# Patient Record
Sex: FEMALE | Race: ASIAN | NOT HISPANIC OR LATINO | Employment: OTHER | URBAN - METROPOLITAN AREA
[De-identification: names, ages, dates, MRNs, and addresses within clinical notes are randomized per-mention and may not be internally consistent; named-entity substitution may affect disease eponyms.]

---

## 2023-01-04 ENCOUNTER — OFFICE VISIT (OUTPATIENT)
Dept: ORTHOPEDIC SURGERY | Facility: CLINIC | Age: 63
End: 2023-01-04
Payer: MEDICAID

## 2023-01-04 VITALS — WEIGHT: 100 LBS | OXYGEN SATURATION: 96 % | HEIGHT: 55 IN | HEART RATE: 84 BPM | BODY MASS INDEX: 23.14 KG/M2

## 2023-01-04 DIAGNOSIS — M19.049 HAND ARTHRITIS: Primary | ICD-10-CM

## 2023-01-04 PROCEDURE — 99203 OFFICE O/P NEW LOW 30 MIN: CPT | Performed by: STUDENT IN AN ORGANIZED HEALTH CARE EDUCATION/TRAINING PROGRAM

## 2023-01-04 RX ORDER — METHYLPREDNISOLONE 4 MG/1
1 TABLET ORAL DAILY
Qty: 21 TABLET | Refills: 0 | Status: SHIPPED | OUTPATIENT
Start: 2023-01-04 | End: 2023-02-20

## 2023-01-04 NOTE — PROGRESS NOTES
Chief Complaint  Initial Evaluation and Pain of the Left Hand and Initial Evaluation and Pain of the Right Hand    Giuseppe Pate presents to [facility] for   History of Present Illness    The patient presents here today for evaluation of the bilateral hands. The patient reports bilateral hand pain and swelling. She denies injury or trauma. She has had a previous x-ray. She has had previous left forearm surgery after a fracture many years ago.  She denies any trauma to the bilateral hands.  She reports the right is worse than the left.  She is right-hand dominant.  She denies any associated numbness.    No Known Allergies     Social History     Socioeconomic History   • Marital status:    Tobacco Use   • Smoking status: Never   • Smokeless tobacco: Never   Vaping Use   • Vaping Use: Never used   Substance and Sexual Activity   • Alcohol use: Never   • Drug use: Never        I reviewed the patient's chief complaint, history of present illness, review of systems, past medical history, surgical history, family history, social history, medications, and allergy list.     REVIEW OF SYSTEMS    Constitutional: Denies fevers, chills, weight loss  Cardiovascular: Denies chest pain, shortness of breath  Skin: Denies rashes, acute skin changes  Neurologic: Denies headache, loss of consciousness  MSK: bilateral hand pain      Objective   Vital Signs:   Pulse 84   Ht 137.2 cm (54\")   Wt 45.4 kg (100 lb)   SpO2 96%   BMI 24.11 kg/m²     Body mass index is 24.11 kg/m².    Physical Exam    General: Alert. No acute distress.   Right hand- swelling and pain to right wrist. arthritic deformities to the thumb and right wrist. Tender to thumb CMC joints bilaterally. Pain with wrist motion. No signs of infection. Stiffness with finger flexion. Lacks full extension. Tender to DRUJ. Ulnar drift.  Neurovascular intact.    Left hand- swelling and pain to wrist. arthritic  deformities to the thumb and wrist. Tender to thumb CMC joints bilaterally. Pain with wrist motion. No signs of infection. Stiffness with finger flexion. Lack full extension.  Neurovascular intact.    Procedures    Imaging Results (Most Recent)     None                   Assessment and Plan        XR Wrist 3+ View Right    Result Date: 12/19/2022  Narrative: PROCEDURE: XR WRIST 3+ VW RIGHT  COMPARISON: None  INDICATIONS: Pain in the right wrist which started 3 days ago with erythema the posterior aspect of the wrist and proximal posterior hand, mild swelling, tenderness with pal  FINDINGS:   No fractures, dislocations or acute osseous abnormalities are identified.  No erosions are identified.  Soft tissue swelling is present in the wrist.  IMPRESSION: Soft tissue swelling in the right wrist.  No acute osseous abnormalities are identified.   NEAL ANDERSON MD       Electronically Signed and Approved By: NEAL ANDERSON MD on 12/19/2022 at 15:57                Diagnoses and all orders for this visit:    1. Hand arthritis, bilaterally (Primary)        Discussed the treatment plan with the patient.  I reviewed the previous images. Plan for conservative treatment at this time. Prescription for a medrol dose pack given today. Prescription for diclofenac given today to start after finishing medrol dose pack.  Given her bilateral presentation with multiple joints including the wrist, this may represent an inflammatory arthropathy.  We will monitor her response to oral anti-inflammatories and may consider rheumatology referral in the future.    Call or return if worsening symptoms.    Scribed for Dagoberto Del Valle MD by Leticia Mehta  01/04/2023   13:33 EST         Follow Up       2 weeks    Patient was given instructions and counseling regarding her condition or for health maintenance advice. Please see specific information pulled into the AVS if appropriate.       I have personally performed the services described  in this document as scribed by the above individual and it is both accurate and complete.     Dagoberto Del Valle MD  01/04/23  13:43 EST

## 2023-01-30 ENCOUNTER — OFFICE VISIT (OUTPATIENT)
Dept: ORTHOPEDIC SURGERY | Facility: CLINIC | Age: 63
End: 2023-01-30
Payer: MEDICAID

## 2023-01-30 VITALS — HEIGHT: 55 IN | HEART RATE: 90 BPM | OXYGEN SATURATION: 98 % | WEIGHT: 96 LBS | BODY MASS INDEX: 22.21 KG/M2

## 2023-01-30 DIAGNOSIS — M19.049 HAND ARTHRITIS: ICD-10-CM

## 2023-01-30 DIAGNOSIS — M19.049 HAND ARTHRITIS: Primary | ICD-10-CM

## 2023-01-30 PROCEDURE — 99213 OFFICE O/P EST LOW 20 MIN: CPT | Performed by: STUDENT IN AN ORGANIZED HEALTH CARE EDUCATION/TRAINING PROGRAM

## 2023-01-30 NOTE — PROGRESS NOTES
"Chief Complaint  Follow-up and Pain of the Left Hand and Follow-up and Pain of the Right Hand    Subjective          Leeanna Pate presents to [facility] for   History of Present Illness    The patient presents here today for follow up evaluation of the bilateral hands. The patient has been treating her bilateral hand osteoarthritis conservatively. She was given a medrol dose pack and diclofenac at her previous visit.     No Known Allergies     Social History     Socioeconomic History   • Marital status:    Tobacco Use   • Smoking status: Never   • Smokeless tobacco: Never   Vaping Use   • Vaping Use: Never used   Substance and Sexual Activity   • Alcohol use: Never   • Drug use: Never        I reviewed the patient's chief complaint, history of present illness, review of systems, past medical history, surgical history, family history, social history, medications, and allergy list.     REVIEW OF SYSTEMS    Constitutional: Denies fevers, chills, weight loss  Cardiovascular: Denies chest pain, shortness of breath  Skin: Denies rashes, acute skin changes  Neurologic: Denies headache, loss of consciousness  MSK: bilateral hand pain      Objective   Vital Signs:   Pulse 90   Ht 137.2 cm (54\")   Wt 43.5 kg (96 lb)   SpO2 98%   BMI 23.15 kg/m²     Body mass index is 23.15 kg/m².    Physical Exam    General: Alert. No acute distress.   Left hand- Arthritic deformity and swelling at left 1st and second MCP joint. Sensation to light touch median, radial, ulnar nerve. Positive AIN, PIN, ulnar nerve motor function. Positive pulses. Stiffness with finger flexion       Right hand-Tender to the Right distal ulna and tfcc. Tender to the radial carpal joint. Tender to thumb CMC joints bilaterally. Pain with wrist motion. No signs of infection. Stiffness with finger flexion. Lack full extension.  Neurovascular intact.    Procedures    Imaging Results (Most Recent)     Procedure Component " Value Units Date/Time    XR Hand 3+ View Left [[id number]] Resulted: 01/30/23 1556     Updated: 01/30/23 1557    Narrative:      Indications: Left hand pain    Views: AP, lateral left hand    Findings: Arthritic changes are seen at the first and second MCP joints.    Joints are reduced.  Deformity to the ulnar styloid and partially   visualized plate and screw construct on the radial shaft.    Comparative Data: No comparative data available                     Assessment and Plan        XR Hand 3+ View Left    Result Date: 1/30/2023  Narrative: Indications: Left hand pain Views: AP, lateral left hand Findings: Arthritic changes are seen at the first and second MCP joints.  Joints are reduced.  Deformity to the ulnar styloid and partially visualized plate and screw construct on the radial shaft. Comparative Data: No comparative data available        Diagnoses and all orders for this visit:    1. Hand arthritis, bilaterally.  (Primary)  -     XR Hand 3+ View Left  -     Ambulatory Referral to Orthopedic Surgery    2. Hand arthritis  -     XR Hand 3+ View Left  -     Ambulatory Referral to Orthopedic Surgery        Discussed the treatment plan with the patient.  I reviewed the x-rays that were obtained today with the patient. Plan for a referral to a hand specialist in [place]. The patient expressed understanding and wished to proceed.     Call or return if worsening symptoms.    Scribed for Dagoberto Del Valle MD by Leticia Mehta  01/30/2023   14:47 EST         Follow Up       PRN    Patient was given instructions and counseling regarding her condition or for health maintenance advice. Please see specific information pulled into the AVS if appropriate.       I have personally performed the services described in this document as scribed by the above individual and it is both accurate and complete.     Dagoberto Del Valle MD  01/31/23  13:41 EST

## 2023-02-08 ENCOUNTER — TELEPHONE (OUTPATIENT)
Dept: FAMILY MEDICINE CLINIC | Facility: CLINIC | Age: 63
End: 2023-02-08
Payer: MEDICAID

## 2023-02-08 NOTE — TELEPHONE ENCOUNTER
ATTEMPTED TO CALL PATIENT ABOUT MISSED APPOINTMENT ON 2/6/23 AT 1:00. LEFT VOICE MAIL ABOUT THAT AND OFFICE ATTENDANCE POLICY.

## 2023-06-01 ENCOUNTER — TELEPHONE (OUTPATIENT)
Dept: PODIATRY | Facility: CLINIC | Age: 63
End: 2023-06-01

## 2024-02-27 ENCOUNTER — OFFICE VISIT (OUTPATIENT)
Dept: FAMILY MEDICINE CLINIC | Facility: CLINIC | Age: 64
End: 2024-02-27
Payer: MEDICAID

## 2024-02-27 VITALS
BODY MASS INDEX: 22.91 KG/M2 | HEIGHT: 55 IN | OXYGEN SATURATION: 98 % | SYSTOLIC BLOOD PRESSURE: 100 MMHG | HEART RATE: 78 BPM | TEMPERATURE: 97.3 F | DIASTOLIC BLOOD PRESSURE: 71 MMHG | WEIGHT: 99 LBS

## 2024-02-27 DIAGNOSIS — M25.531 PAIN IN BOTH WRISTS: Primary | ICD-10-CM

## 2024-02-27 DIAGNOSIS — M25.532 PAIN IN BOTH WRISTS: Primary | ICD-10-CM

## 2024-02-27 DIAGNOSIS — I10 HYPERTENSION, UNSPECIFIED TYPE: ICD-10-CM

## 2024-02-27 DIAGNOSIS — M19.049 HAND ARTHRITIS: ICD-10-CM

## 2024-02-27 LAB
ALBUMIN SERPL-MCNC: 3.9 G/DL (ref 3.5–5.2)
ALBUMIN/GLOB SERPL: 1.2 G/DL
ALP SERPL-CCNC: 65 U/L (ref 39–117)
ALT SERPL W P-5'-P-CCNC: 12 U/L (ref 1–33)
ANION GAP SERPL CALCULATED.3IONS-SCNC: 9 MMOL/L (ref 5–15)
AST SERPL-CCNC: 14 U/L (ref 1–32)
BASOPHILS # BLD AUTO: 0.03 10*3/MM3 (ref 0–0.2)
BASOPHILS NFR BLD AUTO: 0.4 % (ref 0–1.5)
BILIRUB SERPL-MCNC: 0.2 MG/DL (ref 0–1.2)
BUN SERPL-MCNC: 20 MG/DL (ref 8–23)
BUN/CREAT SERPL: 24.4 (ref 7–25)
CALCIUM SPEC-SCNC: 9.4 MG/DL (ref 8.6–10.5)
CHLORIDE SERPL-SCNC: 107 MMOL/L (ref 98–107)
CHROMATIN AB SERPL-ACNC: 76 IU/ML (ref 0–14)
CO2 SERPL-SCNC: 24 MMOL/L (ref 22–29)
CREAT SERPL-MCNC: 0.82 MG/DL (ref 0.57–1)
CRP SERPL-MCNC: 2 MG/DL (ref 0–0.5)
DEPRECATED RDW RBC AUTO: 39.8 FL (ref 37–54)
EGFRCR SERPLBLD CKD-EPI 2021: 80 ML/MIN/1.73
EOSINOPHIL # BLD AUTO: 0.16 10*3/MM3 (ref 0–0.4)
EOSINOPHIL NFR BLD AUTO: 2.2 % (ref 0.3–6.2)
ERYTHROCYTE [DISTWIDTH] IN BLOOD BY AUTOMATED COUNT: 13.2 % (ref 12.3–15.4)
GLOBULIN UR ELPH-MCNC: 3.3 GM/DL
GLUCOSE SERPL-MCNC: 98 MG/DL (ref 65–99)
HCT VFR BLD AUTO: 36.6 % (ref 34–46.6)
HGB BLD-MCNC: 12 G/DL (ref 12–15.9)
IMM GRANULOCYTES # BLD AUTO: 0.04 10*3/MM3 (ref 0–0.05)
IMM GRANULOCYTES NFR BLD AUTO: 0.6 % (ref 0–0.5)
LYMPHOCYTES # BLD AUTO: 1.23 10*3/MM3 (ref 0.7–3.1)
LYMPHOCYTES NFR BLD AUTO: 17 % (ref 19.6–45.3)
MCH RBC QN AUTO: 27.2 PG (ref 26.6–33)
MCHC RBC AUTO-ENTMCNC: 32.8 G/DL (ref 31.5–35.7)
MCV RBC AUTO: 83 FL (ref 79–97)
MONOCYTES # BLD AUTO: 0.61 10*3/MM3 (ref 0.1–0.9)
MONOCYTES NFR BLD AUTO: 8.4 % (ref 5–12)
NEUTROPHILS NFR BLD AUTO: 5.15 10*3/MM3 (ref 1.7–7)
NEUTROPHILS NFR BLD AUTO: 71.4 % (ref 42.7–76)
NRBC BLD AUTO-RTO: 0 /100 WBC (ref 0–0.2)
PLATELET # BLD AUTO: 197 10*3/MM3 (ref 140–450)
PMV BLD AUTO: 12 FL (ref 6–12)
POTASSIUM SERPL-SCNC: 4.2 MMOL/L (ref 3.5–5.2)
PROT SERPL-MCNC: 7.2 G/DL (ref 6–8.5)
RBC # BLD AUTO: 4.41 10*6/MM3 (ref 3.77–5.28)
SODIUM SERPL-SCNC: 140 MMOL/L (ref 136–145)
WBC NRBC COR # BLD AUTO: 7.22 10*3/MM3 (ref 3.4–10.8)

## 2024-02-27 PROCEDURE — 80053 COMPREHEN METABOLIC PANEL: CPT | Performed by: STUDENT IN AN ORGANIZED HEALTH CARE EDUCATION/TRAINING PROGRAM

## 2024-02-27 PROCEDURE — 86140 C-REACTIVE PROTEIN: CPT | Performed by: STUDENT IN AN ORGANIZED HEALTH CARE EDUCATION/TRAINING PROGRAM

## 2024-02-27 PROCEDURE — 85025 COMPLETE CBC W/AUTO DIFF WBC: CPT | Performed by: STUDENT IN AN ORGANIZED HEALTH CARE EDUCATION/TRAINING PROGRAM

## 2024-02-27 PROCEDURE — 86200 CCP ANTIBODY: CPT | Performed by: STUDENT IN AN ORGANIZED HEALTH CARE EDUCATION/TRAINING PROGRAM

## 2024-02-27 PROCEDURE — 86038 ANTINUCLEAR ANTIBODIES: CPT | Performed by: STUDENT IN AN ORGANIZED HEALTH CARE EDUCATION/TRAINING PROGRAM

## 2024-02-27 PROCEDURE — 86431 RHEUMATOID FACTOR QUANT: CPT | Performed by: STUDENT IN AN ORGANIZED HEALTH CARE EDUCATION/TRAINING PROGRAM

## 2024-02-27 RX ORDER — BIOTIN 10 MG
TABLET ORAL
COMMUNITY

## 2024-02-27 NOTE — PROGRESS NOTES
"Chief Complaint  Arthritis and Establish Care    Subjective      History of Present Illness    Leeanna Pate is a 63 y.o. female who presents to [facility]  HTN, B/L hand arthritis presents to establish care today. Pt states she was previously on amlodipine but stopped it due to running out.   She wants her lab work checked today she is  and speak [employer] her daughter-in-law is here translating for her today.    Objective   Vital Signs:   Vitals:    02/27/24 1455   BP: 100/71   Pulse: 78   Temp: 97.3 °F (36.3 °C)   SpO2: 98%   Weight: 44.9 kg (99 lb)   Height: 137.2 cm (54\")     Body mass index is 23.87 kg/m².    Wt Readings from Last 3 Encounters:   02/27/24 44.9 kg (99 lb)   02/08/24 43.8 kg (96 lb 9.6 oz)   05/31/23 45.4 kg (100 lb)     BP Readings from Last 3 Encounters:   02/27/24 100/71   02/08/24 131/80   05/31/23 101/53       Health Maintenance   Topic Date Due    MAMMOGRAM  Never done    COLORECTAL CANCER SCREENING  Never done    HEPATITIS C SCREENING  Never done    ANNUAL PHYSICAL  Never done    PAP SMEAR  Never done    COVID-19 Vaccine (2 - 2023-24 season) 09/01/2023    ZOSTER VACCINE (1 of 2) 02/27/2024 (Originally 6/1/2009)    INFLUENZA VACCINE  03/31/2024 (Originally 8/1/2023)    RSV Vaccine - Adults (1 - 1-dose 60+ series) 02/27/2025 (Originally 6/1/2019)    TDAP/TD VACCINES (1 - Tdap) 02/27/2025 (Originally 6/1/1978)    Pneumococcal Vaccine 0-64  Aged Out       Physical Exam   General: Alert. No acute distress.   Right hand- swelling and pain to right wrist. arthritic deformities to the thumb and right wrist. Tender to thumb CMC joints bilaterally. Pain with wrist motion. No signs of infection. Stiffness with finger flexion. Lacks full extension. Neurovascular intact.     Left hand- swelling and pain to wrist. arthritic deformities to the thumb and wrist. Tender to thumb CMC joints bilaterally. Pain with wrist motion. No signs of infection. Stiffness with " finger flexion. Lack full extension.  Neurovascular intact.     Result Review :     The following data was reviewed by: Cristhian Matos MD on 02/27/2024:      Procedures          Diagnoses and all orders for this visit:    1. Pain in both wrists (Primary)  -     Ambulatory Referral to Rheumatology  -     RADHA Direct Reflex to [facility] [employer]; Future  -     Cyclic citrul peptide antibody, IgG/IgA; Future  -     C-reactive protein; Future  -     Rheumatoid Factor; Future  -     RADHA Direct Reflex to [employer]  -     Cyclic citrul peptide antibody, IgG/IgA  -     C-reactive protein  -     Rheumatoid Factor    2. Hand arthritis  -     Ambulatory Referral to Rheumatology  -     RADHA Direct Reflex to [employer]; Future  -     Cyclic citrul peptide antibody, IgG/IgA; Future  -     C-reactive protein; Future  -     Rheumatoid Factor; Future  -     RADHA Direct Reflex to [employer]  -     Cyclic citrul peptide antibody, IgG/IgA  -     C-reactive protein  -     Rheumatoid Factor    3. Hypertension, unspecified type  -     CBC w AUTO Differential; Future  -     Comprehensive metabolic panel; Future  -     CBC w AUTO Differential  -     Comprehensive metabolic panel    Labs ordered, plan on discontinuing amlodipine due to blood pressure being normal today and not being on medication.      BMI is within normal parameters. No other follow-up for BMI required.         FOLLOW UP  No follow-ups on file.  Patient was given instructions and counseling regarding her condition or for health maintenance advice. Please see specific information pulled into the AVS if appropriate.       Cristhian Matos MD  02/27/24  16:06 EST    CURRENT & DISCONTINUED MEDICATIONS  Current Outpatient Medications   Medication Instructions    amLODIPine (NORVASC) 5 mg, Oral, Daily    Biotin 10 MG tablet Oral    cholecalciferol (VITAMIN D3) 2,000 Units, Oral, Daily    naproxen (NAPROSYN) 500 mg, Oral, 2 Times Daily PRN       There are no discontinued medications.

## 2024-02-29 LAB
ANA SER QL: NEGATIVE
CCP IGA+IGG SERPL IA-ACNC: >250 UNITS (ref 0–19)

## 2024-03-05 DIAGNOSIS — M05.79 RHEUMATOID ARTHRITIS INVOLVING MULTIPLE SITES WITH POSITIVE RHEUMATOID FACTOR: Primary | ICD-10-CM

## 2024-03-25 ENCOUNTER — TELEPHONE (OUTPATIENT)
Dept: FAMILY MEDICINE CLINIC | Facility: CLINIC | Age: 64
End: 2024-03-25
Payer: MEDICAID

## 2024-03-25 NOTE — TELEPHONE ENCOUNTER
Caller: GLO WALKER    Relationship: Emergency Contact    Best call back number: [phone]    Caller requesting test results: DAUGHTER IN LAW     What test was performed: LABS     Where was the test performed: IN OFFICE     Additional notes: PATIENT'S DAUGHTER IN LAW HAS ADDITIONAL QUESTIONS REGARDING THE LEVELS OF LABS.   SHE IS REQUESTING COPY OF LAB RESULTS.

## 2024-04-15 ENCOUNTER — OFFICE VISIT (OUTPATIENT)
Dept: FAMILY MEDICINE CLINIC | Facility: CLINIC | Age: 64
End: 2024-04-15
Payer: MEDICAID

## 2024-04-15 VITALS
BODY MASS INDEX: 22.45 KG/M2 | HEIGHT: 55 IN | DIASTOLIC BLOOD PRESSURE: 80 MMHG | TEMPERATURE: 97 F | SYSTOLIC BLOOD PRESSURE: 112 MMHG | OXYGEN SATURATION: 98 % | WEIGHT: 97 LBS | HEART RATE: 84 BPM

## 2024-04-15 DIAGNOSIS — M05.7A RHEUMATOID ARTHRITIS OF OTHER SITE WITH POSITIVE RHEUMATOID FACTOR: Primary | ICD-10-CM

## 2024-04-15 PROCEDURE — 99213 OFFICE O/P EST LOW 20 MIN: CPT | Performed by: STUDENT IN AN ORGANIZED HEALTH CARE EDUCATION/TRAINING PROGRAM

## 2024-04-15 NOTE — PROGRESS NOTES
"Chief Complaint  Follow-up (RA)    Subjective      History of Present Illness    Leeanna Pate is a 63 y.o. female who presents to [facility] presents for follow-up on rheumatoid arthritis labs.  Her rheumatoid factor was noted to be 76, CRP was 2, anti-CCP greater than 250 RADHA was negative.  Presents today for follow-up we will get her referred to Dr. Solares here last month for rheumatology have already spoken to his office and they agreed to accept the patient for treatment.        Objective   Vital Signs:   Vitals:    04/15/24 0839   BP: 112/80   Pulse: 84   Temp: 97 °F (36.1 °C)   SpO2: 98%   Weight: 44 kg (97 lb)   Height: 137.2 cm (54\")     Body mass index is 23.39 kg/m².    Wt Readings from Last 3 Encounters:   04/15/24 44 kg (97 lb)   04/11/24 43.5 kg (96 lb)   02/27/24 44.9 kg (99 lb)     BP Readings from Last 3 Encounters:   04/15/24 112/80   04/11/24 174/94   02/27/24 100/71       Health Maintenance   Topic Date Due    MAMMOGRAM  Never done    COLORECTAL CANCER SCREENING  Never done    ZOSTER VACCINE (1 of 2) Never done    HEPATITIS C SCREENING  Never done    ANNUAL PHYSICAL  Never done    PAP SMEAR  Never done    COVID-19 Vaccine (2 - 2023-24 season) 09/01/2023    RSV Vaccine - Adults (1 - 1-dose 60+ series) 02/27/2025 (Originally 6/1/2019)    TDAP/TD VACCINES (1 - Tdap) 02/27/2025 (Originally 6/1/1978)    INFLUENZA VACCINE  08/01/2024    Pneumococcal Vaccine 0-64  Aged Out       Physical Exam   General:  AAO x3, no acute distress.  Eyes:  EOMI, PERRLA  Ears/Nose/Mouth/Throat:  Moist mucous membranes  Respiratory:  CTA bilaterally, no wheezes rales or rhonchi  Cardiovascular:  RRR no murmur rubs or gallops  Gastrointestinal:  Abdomen soft nondistended nontender bowel sounds present x4 quadrants  Musculoskeletal:  Moves all 4 extremities spontaneously, no apparent weakness  Skin:  Warm, dry, no skin rashes or lesions  Neurologic:  AAO x3, cranial nerves 2-12 grossly " intact, no focal neuro deficits  Psychiatric:  Normal mood and affect    Result Review :     The following data was reviewed by: Cristhian Matos MD on 04/15/2024:      Procedures          Diagnoses and all orders for this visit:    1. Rheumatoid arthritis of other site with positive rheumatoid factor (Primary)  -     Ambulatory Referral to Rheumatology         BMI is within normal parameters. No other follow-up for BMI required.         FOLLOW UP  No follow-ups on file.  Patient was given instructions and counseling regarding her condition or for health maintenance advice. Please see specific information pulled into the AVS if appropriate.       Cristhian Matos MD  04/15/24  09:12 EDT    CURRENT & DISCONTINUED MEDICATIONS  Current Outpatient Medications   Medication Instructions    celecoxib (CELEBREX) 50 mg, Oral, 2 Times Daily    methylPREDNISolone (MEDROL) 4 MG dose pack Take as directed on package instructions.       There are no discontinued medications.

## 2024-04-16 ENCOUNTER — TELEPHONE (OUTPATIENT)
Dept: FAMILY MEDICINE CLINIC | Facility: CLINIC | Age: 64
End: 2024-04-16
Payer: MEDICAID

## 2024-04-16 NOTE — TELEPHONE ENCOUNTER
Caller: GLO WALKER    Relationship: Emergency Contact    Best call back number:     [phone]       Who are you requesting to speak with (clinical staff, provider,  specific staff member): DR MONTIEL      What was the call regarding: PATIENT'S DAUGHTER IN LAW STATES THAT DR MONTIEL HAD TOLD THEM THAT RHEUMATOLOGY WOULD BE CALLING HER YESTERDAY AFTERNOON BUT THAT SHE NEVER GOT A CALL.     SHE STATES THAT SHE WOULD STILL LIKE FOR THE RHEUMATOLOGIST TO CALL.

## 2024-04-16 NOTE — TELEPHONE ENCOUNTER
DR LOPEZ'S OFFICE WILL NOT TAKE PATIENTS INSURANCE, PATIENT HAS A APPOINTMENT WITH DR RENEA WILLIS 9-10-14 AT 9:40 AND THAT OFFICE HAS HER ON A CANCELATION LIST

## 2024-05-08 ENCOUNTER — OFFICE VISIT (OUTPATIENT)
Dept: FAMILY MEDICINE CLINIC | Facility: CLINIC | Age: 64
End: 2024-05-08
Payer: MEDICAID

## 2024-05-08 VITALS
WEIGHT: 95 LBS | OXYGEN SATURATION: 99 % | BODY MASS INDEX: 21.98 KG/M2 | HEIGHT: 55 IN | HEART RATE: 97 BPM | TEMPERATURE: 97.1 F

## 2024-05-08 DIAGNOSIS — M25.532 PAIN IN BOTH WRISTS: ICD-10-CM

## 2024-05-08 DIAGNOSIS — M25.531 PAIN IN BOTH WRISTS: ICD-10-CM

## 2024-05-08 DIAGNOSIS — R79.82 ELEVATED C-REACTIVE PROTEIN (CRP): ICD-10-CM

## 2024-05-08 DIAGNOSIS — M05.79 RHEUMATOID ARTHRITIS INVOLVING MULTIPLE SITES WITH POSITIVE RHEUMATOID FACTOR: Primary | ICD-10-CM

## 2024-05-08 PROCEDURE — 99214 OFFICE O/P EST MOD 30 MIN: CPT | Performed by: STUDENT IN AN ORGANIZED HEALTH CARE EDUCATION/TRAINING PROGRAM

## 2024-05-08 RX ORDER — METHOTREXATE 2.5 MG/1
7.5 TABLET ORAL WEEKLY
Qty: 12 TABLET | Refills: 2 | Status: SHIPPED | OUTPATIENT
Start: 2024-05-08 | End: 2024-08-06

## 2024-05-08 RX ORDER — FOLIC ACID 1 MG/1
1 TABLET ORAL DAILY
Qty: 90 TABLET | Refills: 3 | Status: SHIPPED | OUTPATIENT
Start: 2024-05-08 | End: 2024-08-06

## 2024-07-02 ENCOUNTER — OFFICE VISIT (OUTPATIENT)
Dept: FAMILY MEDICINE CLINIC | Facility: CLINIC | Age: 64
End: 2024-07-02
Payer: MEDICARE

## 2024-07-02 VITALS
SYSTOLIC BLOOD PRESSURE: 138 MMHG | HEART RATE: 89 BPM | DIASTOLIC BLOOD PRESSURE: 78 MMHG | HEIGHT: 55 IN | BODY MASS INDEX: 21.59 KG/M2 | OXYGEN SATURATION: 98 % | WEIGHT: 93.3 LBS | TEMPERATURE: 98.1 F

## 2024-07-02 DIAGNOSIS — M25.562 CHRONIC PAIN OF BOTH KNEES: ICD-10-CM

## 2024-07-02 DIAGNOSIS — Z12.31 ENCOUNTER FOR SCREENING MAMMOGRAM FOR MALIGNANT NEOPLASM OF BREAST: Primary | ICD-10-CM

## 2024-07-02 DIAGNOSIS — G89.29 CHRONIC PAIN OF BOTH KNEES: ICD-10-CM

## 2024-07-02 DIAGNOSIS — M05.7A RHEUMATOID ARTHRITIS OF OTHER SITE WITH POSITIVE RHEUMATOID FACTOR: ICD-10-CM

## 2024-07-02 DIAGNOSIS — M25.561 CHRONIC PAIN OF BOTH KNEES: ICD-10-CM

## 2024-07-02 DIAGNOSIS — Z78.0 POST-MENOPAUSAL: ICD-10-CM

## 2024-07-02 DIAGNOSIS — Z12.11 SCREEN FOR COLON CANCER: ICD-10-CM

## 2024-07-02 PROCEDURE — 1159F MED LIST DOCD IN RCRD: CPT | Performed by: STUDENT IN AN ORGANIZED HEALTH CARE EDUCATION/TRAINING PROGRAM

## 2024-07-02 PROCEDURE — 1125F AMNT PAIN NOTED PAIN PRSNT: CPT | Performed by: STUDENT IN AN ORGANIZED HEALTH CARE EDUCATION/TRAINING PROGRAM

## 2024-07-02 PROCEDURE — 99214 OFFICE O/P EST MOD 30 MIN: CPT | Performed by: STUDENT IN AN ORGANIZED HEALTH CARE EDUCATION/TRAINING PROGRAM

## 2024-07-02 PROCEDURE — 1160F RVW MEDS BY RX/DR IN RCRD: CPT | Performed by: STUDENT IN AN ORGANIZED HEALTH CARE EDUCATION/TRAINING PROGRAM

## 2024-07-02 NOTE — PROGRESS NOTES
"Chief Complaint  Arthritis (3 month f/u )    Subjective      History of Present Illness    Leeanna Pate is a 64 y.o. female who presents to [facility]   With a past medical history of arthritis, presents for follow-up today we started her on methotrexate her joints are beginning to feel little bit better she has an appointment with rheumatology in 2 weeks.  Has no acute complaints today she does complain of bilateral knee pain and would like to start physical therapy.      Objective   Vital Signs:   Vitals:    07/02/24 1409   BP: 138/78   Pulse: 89   Temp: 98.1 °F (36.7 °C)   SpO2: 98%   Weight: 42.3 kg (93 lb 4.8 oz)   Height: 137.2 cm (54\")   PainSc:   5   PainLoc: Leg     Body mass index is 22.5 kg/m².    Wt Readings from Last 3 Encounters:   07/02/24 42.3 kg (93 lb 4.8 oz)   05/08/24 43.1 kg (95 lb)   04/15/24 44 kg (97 lb)     BP Readings from Last 3 Encounters:   07/02/24 138/78   04/15/24 112/80   04/11/24 174/94       Health Maintenance   Topic Date Due    MAMMOGRAM  Never done    DXA SCAN  Never done    COLORECTAL CANCER SCREENING  Never done    ZOSTER VACCINE (1 of 2) Never done    HEPATITIS C SCREENING  Never done    ANNUAL WELLNESS VISIT  Never done    Pneumococcal Vaccine 65+ (1 of 1 - PCV) Never done    COVID-19 Vaccine (2 - 2023-24 season) 07/28/2024 (Originally 9/1/2023)    RSV Vaccine - Adults (1 - 1-dose 60+ series) 02/27/2025 (Originally 6/1/2019)    TDAP/TD VACCINES (1 - Tdap) 02/27/2025 (Originally 6/1/1978)    INFLUENZA VACCINE  08/01/2024       Physical Exam   General:  AAO x3, no acute distress.  Eyes:  EOMI, PERRLA  Ears/Nose/Mouth/Throat:  Moist mucous membranes  Respiratory:  CTA bilaterally, no wheezes rales or rhonchi  Cardiovascular:  RRR no murmur rubs or gallops  Gastrointestinal:  Abdomen soft nondistended nontender bowel sounds present x4 quadrants  Musculoskeletal: Right hand with significant rheumatoid nodules left hand wrist tenderness moves " all 4 extremities spontaneously, no apparent weakness  Skin:  Warm, dry, no skin rashes or lesions  Neurologic:  AAO x3, cranial nerves 2-12 grossly intact, no focal neuro deficits  Psychiatric:  Normal mood and affect    Result Review :     The following data was reviewed by: Cristhian Matos MD on 07/02/2024:      Procedures          Diagnoses and all orders for this visit:    1. Encounter for screening mammogram for malignant neoplasm of breast (Primary)  -     Mammo Screening Digital Tomosynthesis Bilateral With CAD; Future    2. Screen for colon cancer    3. Chronic pain of both knees  -     Ambulatory Referral to Physical Therapy    4. Rheumatoid arthritis of other site with positive rheumatoid factor    5. Post-menopausal  -     DEXA Bone Density Axial    Continue taking methotrexate 7.5 mg weekly and folic acid daily.  Her mammogram physical therapy and DEXA scan today.  Patient will return in 3 months for annual physical.  BMI is within normal parameters. No other follow-up for BMI required.         FOLLOW UP  Return in about 2 months (around 9/2/2024).  Patient was given instructions and counseling regarding her condition or for health maintenance advice. Please see specific information pulled into the AVS if appropriate.       Cristhian Matos MD  07/03/24  08:25 EDT    CURRENT & DISCONTINUED MEDICATIONS  Current Outpatient Medications   Medication Instructions    diclofenac (VOLTAREN) 50 mg, Oral, 2 Times Daily PRN    folic acid (FOLVITE) 1 mg, Oral, Daily    methotrexate 7.5 mg, Oral, Weekly       There are no discontinued medications.

## 2024-11-14 ENCOUNTER — OFFICE VISIT (OUTPATIENT)
Dept: FAMILY MEDICINE CLINIC | Facility: CLINIC | Age: 64
End: 2024-11-14
Payer: MEDICARE

## 2024-11-14 VITALS
OXYGEN SATURATION: 98 % | HEIGHT: 55 IN | TEMPERATURE: 97.3 F | WEIGHT: 95 LBS | DIASTOLIC BLOOD PRESSURE: 84 MMHG | HEART RATE: 94 BPM | BODY MASS INDEX: 21.98 KG/M2 | SYSTOLIC BLOOD PRESSURE: 130 MMHG

## 2024-11-14 DIAGNOSIS — I10 HYPERTENSION, UNSPECIFIED TYPE: ICD-10-CM

## 2024-11-14 DIAGNOSIS — M25.532 PAIN IN BOTH WRISTS: ICD-10-CM

## 2024-11-14 DIAGNOSIS — Z12.31 SCREENING MAMMOGRAM FOR BREAST CANCER: ICD-10-CM

## 2024-11-14 DIAGNOSIS — Z23 NEED FOR INFLUENZA VACCINATION: Primary | ICD-10-CM

## 2024-11-14 DIAGNOSIS — Z12.11 COLON CANCER SCREENING: ICD-10-CM

## 2024-11-14 DIAGNOSIS — M05.7A RHEUMATOID ARTHRITIS OF OTHER SITE WITH POSITIVE RHEUMATOID FACTOR: ICD-10-CM

## 2024-11-14 DIAGNOSIS — M25.531 PAIN IN BOTH WRISTS: ICD-10-CM

## 2024-11-14 DIAGNOSIS — Z78.0 POST-MENOPAUSAL: ICD-10-CM

## 2024-11-14 PROCEDURE — G0008 ADMIN INFLUENZA VIRUS VAC: HCPCS | Performed by: STUDENT IN AN ORGANIZED HEALTH CARE EDUCATION/TRAINING PROGRAM

## 2024-11-14 PROCEDURE — 1125F AMNT PAIN NOTED PAIN PRSNT: CPT | Performed by: STUDENT IN AN ORGANIZED HEALTH CARE EDUCATION/TRAINING PROGRAM

## 2024-11-14 PROCEDURE — 90662 IIV NO PRSV INCREASED AG IM: CPT | Performed by: STUDENT IN AN ORGANIZED HEALTH CARE EDUCATION/TRAINING PROGRAM

## 2024-11-14 PROCEDURE — 1159F MED LIST DOCD IN RCRD: CPT | Performed by: STUDENT IN AN ORGANIZED HEALTH CARE EDUCATION/TRAINING PROGRAM

## 2024-11-14 PROCEDURE — 99214 OFFICE O/P EST MOD 30 MIN: CPT | Performed by: STUDENT IN AN ORGANIZED HEALTH CARE EDUCATION/TRAINING PROGRAM

## 2024-11-14 PROCEDURE — G0438 PPPS, INITIAL VISIT: HCPCS | Performed by: STUDENT IN AN ORGANIZED HEALTH CARE EDUCATION/TRAINING PROGRAM

## 2024-11-14 PROCEDURE — 1160F RVW MEDS BY RX/DR IN RCRD: CPT | Performed by: STUDENT IN AN ORGANIZED HEALTH CARE EDUCATION/TRAINING PROGRAM

## 2024-11-14 RX ORDER — PREDNISONE 5 MG/1
1 TABLET ORAL DAILY
COMMUNITY
Start: 2024-10-23

## 2024-11-14 RX ORDER — METHOTREXATE 2.5 MG/1
TABLET ORAL
COMMUNITY
Start: 2024-10-26

## 2024-11-14 RX ORDER — FOLIC ACID 1 MG/1
1 TABLET ORAL DAILY
COMMUNITY
Start: 2024-09-27

## 2024-11-14 RX ORDER — ISONIAZID 100 MG/1
2 TABLET ORAL DAILY
COMMUNITY
Start: 2024-09-27

## 2024-11-14 NOTE — PROGRESS NOTES
" Subjective   The ABCs of the Annual Wellness Visit  Medicare Wellness Visit      Leeanna Pate is a 64 y.o. patient who presents for a Medicare Wellness Visit.    The following portions of the patient's history were reviewed and   updated as appropriate: allergies, current medications, past family history, past medical history, past social history, past surgical history, and problem list.    Compared to one year ago, the patient's physical   health is better.  Compared to one year ago, the patient's mental   health is better.    Recent Hospitalizations:  She was not admitted to the hospital during the last year.     Current Medical Providers:  Patient Care Team:  Cristhian Matos MD as PCP - General (Family Medicine)    Outpatient Medications Prior to Visit   Medication Sig Dispense Refill    folic acid (FOLVITE) 1 MG tablet Take 1 tablet by mouth Daily.      isoniazid (NYDRAZID) 100 MG tablet Take 2 tablets by mouth Daily.      methotrexate 2.5 MG tablet TAKE 5 TABLETS BY MOUTH ONCE A WEEK      predniSONE (DELTASONE) 5 MG tablet Take 1 tablet by mouth Daily.       No facility-administered medications prior to visit.     No opioid medication identified on active medication list. I have reviewed chart for other potential  high risk medication/s and harmful drug interactions in the elderly.      Aspirin is not on active medication list.  Aspirin use is not indicated based on review of current medical condition/s. Risk of harm outweighs potential benefits.  .    Patient Active Problem List   Diagnosis    Hand arthritis, bilaterally     Advance Care Planning Advance Directive is not on file.  ACP discussion was declined by the patient. Patient has an advance directive (not in EMR), copy requested.            Objective   Vitals:    11/14/24 1411   BP: 130/84   Pulse: 94   Temp: 97.3 °F (36.3 °C)   SpO2: 98%   Weight: 43.1 kg (95 lb)   Height: 137.2 cm (54\")   PainSc:   2       Estimated body mass index is 22.91 kg/m² as " "calculated from the following:    Height as of this encounter: 137.2 cm (54\").    Weight as of this encounter: 43.1 kg (95 lb).    BMI is within normal parameters. No other follow-up for BMI required.         Gait and Balance Evaluation:  Normal  Does the patient have evidence of cognitive impairment? No                                                                                                Health  Risk Assessment    Smoking Status:  Social History     Tobacco Use   Smoking Status Never   Smokeless Tobacco Never     Alcohol Consumption:  Social History     Substance and Sexual Activity   Alcohol Use Never       Fall Risk Screen  STEADI Fall Risk Assessment was completed, and patient is at LOW risk for falls.Assessment completed on:2024    Depression Screening   Little interest or pleasure in doing things? Not at all   Feeling down, depressed, or hopeless? Not at all   PHQ-2 Total Score 0      Health Habits and Functional and Cognitive Screenin/14/2024     2:06 PM   Functional & Cognitive Status   Do you have difficulty preparing food and eating? No   Do you have difficulty bathing yourself, getting dressed or grooming yourself? No   Do you have difficulty using the toilet? No   Do you have difficulty moving around from place to place? No   Do you have trouble with steps or getting out of a bed or a chair? No   Current Diet Well Balanced Diet   Dental Exam Not up to date   Eye Exam Not up to date   Exercise (times per week) 2 times per week   Current Exercises Include House Cleaning   Do you need help using the phone?  No   Are you deaf or do you have serious difficulty hearing?  No   Do you need help to go to places out of walking distance? No   Do you need help shopping? No   Do you need help preparing meals?  No   Do you need help with housework?  No   Do you need help with laundry? No   Do you need help taking your medications? No   Do you need help managing money? No   Do you ever drive or " ride in a car without wearing a seat belt? No   Have you felt unusual stress, anger or loneliness in the last month? No   Who do you live with? Spouse   If you need help, do you have trouble finding someone available to you? No   Have you been bothered in the last four weeks by sexual problems? No   Do you have difficulty concentrating, remembering or making decisions? No           Visual Acuity:  Vision Screening    Right eye Left eye Both eyes   Without correction      With correction 20/25 2025 2025     Age-appropriate Screening Schedule:  Refer to the list below for future screening recommendations based on patient's age, sex and/or medical conditions. Orders for these recommended tests are listed in the plan section. The patient has been provided with a written plan.    Health Maintenance List  Health Maintenance   Topic Date Due    DXA SCAN  Never done    COLORECTAL CANCER SCREENING  Never done    MAMMOGRAM  Never done    ZOSTER VACCINE (1 of 2) Never done    HEPATITIS C SCREENING  Never done    COVID-19 Vaccine (2 - 2024-25 season) 02/03/2025 (Originally 9/1/2024)    TDAP/TD VACCINES (1 - Tdap) 02/27/2025 (Originally 6/1/1979)    Pneumococcal Vaccine 65+ (1 of 1 - PCV) 11/14/2025 (Originally 6/1/2024)    ANNUAL WELLNESS VISIT  11/14/2025    INFLUENZA VACCINE  Completed                                                                                                                                                CMS Preventative Services Quick Reference  Risk Factors Identified During Encounter  None Identified    The above risks/problems have been discussed with the patient.  Pertinent information has been shared with the patient in the After Visit Summary.  An After Visit Summary and PPPS were made available to the patient.    Follow Up:   Next Medicare Wellness visit to be scheduled in 1 year.          Additional E&M Note during same encounter follows:  Patient has multiple medical problems which are  "significant and separately identifiable that require additional work above and beyond the Medicare Wellness Visit.      Chief Complaint  Medicare Wellness-Initial Visit    Leeanna Pate is a 64 y.o. female who presents to [facility]     Follow-up on rheumatoid arthritis she was diagnosed by me 6 months ago she is currently seeing a rheumatoid specialist she has not required any bony tenderness some stiffness in the morning but it has improved much since diagnosis 6 months ago.  Objective   Vital Signs:   Vitals:    11/14/24 1411   BP: 130/84   Pulse: 94   Temp: 97.3 °F (36.3 °C)   SpO2: 98%   Weight: 43.1 kg (95 lb)   Height: 137.2 cm (54\")   PainSc:   2       Wt Readings from Last 3 Encounters:   11/14/24 43.1 kg (95 lb)   07/02/24 42.3 kg (93 lb 4.8 oz)   05/08/24 43.1 kg (95 lb)     BP Readings from Last 3 Encounters:   11/14/24 130/84   07/02/24 138/78   04/15/24 112/80       Physical Exam  Constitutional:       Appearance: Normal appearance.   HENT:      Head: Normocephalic.      Nose: Nose normal.      Mouth/Throat:      Mouth: Mucous membranes are moist.   Eyes:      Pupils: Pupils are equal, round, and reactive to light.   Cardiovascular:      Rate and Rhythm: Normal rate and regular rhythm.   Pulmonary:      Effort: Pulmonary effort is normal.   Abdominal:      General: Abdomen is flat.   Musculoskeletal:         General: Normal range of motion.      Right elbow: Normal.      Right hand: Deformity, tenderness and bony tenderness present.      Left hand: Deformity, tenderness and bony tenderness present.      Cervical back: Normal range of motion.      Comments: Right knuckle swelling   Skin:     General: Skin is warm and dry.   Neurological:      General: No focal deficit present.      Mental Status: She is alert.   Psychiatric:         Mood and Affect: Mood normal.         Thought Content: Thought content normal.         The following data was reviewed by Cristhian Matos, " MD on 11/14/2024  Common Labs   Common labs          2/27/2024    16:29   Common Labs   Glucose 98    BUN 20    Creatinine 0.82    Sodium 140    Potassium 4.2    Chloride 107    Calcium 9.4    Albumin 3.9    Total Bilirubin 0.2    Alkaline Phosphatase 65    AST (SGOT) 14    ALT (SGPT) 12    WBC 7.22    Hemoglobin 12.0    Hematocrit 36.6    Platelets 197      Radiologic studies      Assessment & Plan   Diagnoses and all orders for this visit:    1. Need for influenza vaccination (Primary)  -     Fluzone High-Dose 65+yrs ([id number])    2. Rheumatoid arthritis of other site with positive rheumatoid factor    3. Post-menopausal  -     DEXA Bone Density Axial    4. Colon cancer screening  -     Cologuard - Stool, Per Rectum; Future    5. Screening mammogram for breast cancer  -     Mammo Screening Digital Tomosynthesis Bilateral With CAD; Future    6. Pain in both wrists    7. Hypertension, unspecified type    Other orders  -     Cancel: Fluzone >6mos ([id number])          BMI is within normal parameters. No other follow-up for BMI required.       FOLLOW UP  Return in about 6 months (around 5/14/2025).  Patient was given instructions and counseling regarding her condition or for health maintenance advice. Please see specific information pulled into the AVS if appropriate.     Cristhian Matos MD  11/18/24  09:48 EST

## 2024-11-29 ENCOUNTER — HOSPITAL ENCOUNTER (EMERGENCY)
Facility: HOSPITAL | Age: 64
Discharge: HOME | End: 2024-11-29
Attending: EMERGENCY MEDICINE
Payer: MEDICARE

## 2024-11-29 ENCOUNTER — APPOINTMENT (OUTPATIENT)
Dept: GENERAL RADIOLOGY | Facility: HOSPITAL | Age: 64
End: 2024-11-29
Payer: MEDICARE

## 2024-11-29 VITALS
HEART RATE: 105 BPM | HEIGHT: 55 IN | TEMPERATURE: 98.6 F | DIASTOLIC BLOOD PRESSURE: 78 MMHG | BODY MASS INDEX: 20.97 KG/M2 | OXYGEN SATURATION: 93 % | SYSTOLIC BLOOD PRESSURE: 136 MMHG | WEIGHT: 90.61 LBS | RESPIRATION RATE: 28 BRPM

## 2024-11-29 DIAGNOSIS — B33.8 RSV INFECTION: Primary | ICD-10-CM

## 2024-11-29 DIAGNOSIS — J18.9 COMMUNITY ACQUIRED PNEUMONIA OF RIGHT LOWER LOBE OF LUNG: ICD-10-CM

## 2024-11-29 LAB
ALBUMIN SERPL-MCNC: 3.6 G/DL (ref 3.5–5.2)
ALBUMIN/GLOB SERPL: 0.9 G/DL
ALP SERPL-CCNC: 81 U/L (ref 39–117)
ALT SERPL W P-5'-P-CCNC: 14 U/L (ref 1–33)
ANION GAP SERPL CALCULATED.3IONS-SCNC: 13.6 MMOL/L (ref 5–15)
AST SERPL-CCNC: 19 U/L (ref 1–32)
BILIRUB SERPL-MCNC: 0.7 MG/DL (ref 0–1.2)
BUN SERPL-MCNC: 20 MG/DL (ref 8–23)
BUN/CREAT SERPL: 27.8 (ref 7–25)
CALCIUM SPEC-SCNC: 9.1 MG/DL (ref 8.6–10.5)
CHLORIDE SERPL-SCNC: 96 MMOL/L (ref 98–107)
CO2 SERPL-SCNC: 22.4 MMOL/L (ref 22–29)
CREAT SERPL-MCNC: 0.72 MG/DL (ref 0.57–1)
D-LACTATE SERPL-SCNC: 1 MMOL/L (ref 0.5–2)
DEPRECATED RDW RBC AUTO: 44.8 FL (ref 37–54)
EGFRCR SERPLBLD CKD-EPI 2021: 92.9 ML/MIN/1.73
ERYTHROCYTE [DISTWIDTH] IN BLOOD BY AUTOMATED COUNT: 14.1 % (ref 12.3–15.4)
FLUAV SUBTYP SPEC NAA+PROBE: NOT DETECTED
FLUBV RNA ISLT QL NAA+PROBE: NOT DETECTED
GLOBULIN UR ELPH-MCNC: 3.8 GM/DL
GLUCOSE SERPL-MCNC: 119 MG/DL (ref 65–99)
HCT VFR BLD AUTO: 37.5 % (ref 34–46.6)
HGB BLD-MCNC: 12.1 G/DL (ref 12–15.9)
HOLD SPECIMEN: NORMAL
HOLD SPECIMEN: NORMAL
LYMPHOCYTES # BLD MANUAL: 0.26 10*3/MM3 (ref 0.7–3.1)
LYMPHOCYTES NFR BLD MANUAL: 7 % (ref 5–12)
MCH RBC QN AUTO: 28 PG (ref 26.6–33)
MCHC RBC AUTO-ENTMCNC: 32.3 G/DL (ref 31.5–35.7)
MCV RBC AUTO: 86.8 FL (ref 79–97)
MONOCYTES # BLD: 0.91 10*3/MM3 (ref 0.1–0.9)
NEUTROPHILS # BLD AUTO: 11.86 10*3/MM3 (ref 1.7–7)
NEUTROPHILS NFR BLD MANUAL: 84 % (ref 42.7–76)
NEUTS BAND NFR BLD MANUAL: 7 % (ref 0–5)
NT-PROBNP SERPL-MCNC: 647.6 PG/ML (ref 0–900)
PLAT MORPH BLD: NORMAL
PLATELET # BLD AUTO: 167 10*3/MM3 (ref 140–450)
PMV BLD AUTO: 11 FL (ref 6–12)
POTASSIUM SERPL-SCNC: 3.9 MMOL/L (ref 3.5–5.2)
PROT SERPL-MCNC: 7.4 G/DL (ref 6–8.5)
QT INTERVAL: 263 MS
QTC INTERVAL: 396 MS
RBC # BLD AUTO: 4.32 10*6/MM3 (ref 3.77–5.28)
RBC MORPH BLD: NORMAL
RSV RNA NPH QL NAA+NON-PROBE: DETECTED
SARS-COV-2 RNA RESP QL NAA+PROBE: NOT DETECTED
SCAN SLIDE: NORMAL
SODIUM SERPL-SCNC: 132 MMOL/L (ref 136–145)
TROPONIN T SERPL HS-MCNC: 16 NG/L
VARIANT LYMPHS NFR BLD MANUAL: 2 % (ref 19.6–45.3)
WBC MORPH BLD: NORMAL
WBC NRBC COR # BLD AUTO: 13.03 10*3/MM3 (ref 3.4–10.8)
WHOLE BLOOD HOLD COAG: NORMAL
WHOLE BLOOD HOLD SPECIMEN: NORMAL

## 2024-11-29 PROCEDURE — 25010000002 CEFTRIAXONE PER 250 MG: Performed by: EMERGENCY MEDICINE

## 2024-11-29 PROCEDURE — 96374 THER/PROPH/DIAG INJ IV PUSH: CPT

## 2024-11-29 PROCEDURE — 85025 COMPLETE CBC W/AUTO DIFF WBC: CPT | Performed by: EMERGENCY MEDICINE

## 2024-11-29 PROCEDURE — 80053 COMPREHEN METABOLIC PANEL: CPT | Performed by: EMERGENCY MEDICINE

## 2024-11-29 PROCEDURE — 93005 ELECTROCARDIOGRAM TRACING: CPT | Performed by: EMERGENCY MEDICINE

## 2024-11-29 PROCEDURE — 96361 HYDRATE IV INFUSION ADD-ON: CPT

## 2024-11-29 PROCEDURE — 85007 BL SMEAR W/DIFF WBC COUNT: CPT | Performed by: EMERGENCY MEDICINE

## 2024-11-29 PROCEDURE — 99284 EMERGENCY DEPT VISIT MOD MDM: CPT

## 2024-11-29 PROCEDURE — 71045 X-RAY EXAM CHEST 1 VIEW: CPT

## 2024-11-29 PROCEDURE — 87637 SARSCOV2&INF A&B&RSV AMP PRB: CPT | Performed by: EMERGENCY MEDICINE

## 2024-11-29 PROCEDURE — 83880 ASSAY OF NATRIURETIC PEPTIDE: CPT | Performed by: EMERGENCY MEDICINE

## 2024-11-29 PROCEDURE — 25810000003 SODIUM CHLORIDE 0.9 % SOLUTION: Performed by: EMERGENCY MEDICINE

## 2024-11-29 PROCEDURE — 36415 COLL VENOUS BLD VENIPUNCTURE: CPT

## 2024-11-29 PROCEDURE — 84484 ASSAY OF TROPONIN QUANT: CPT | Performed by: EMERGENCY MEDICINE

## 2024-11-29 PROCEDURE — 87040 BLOOD CULTURE FOR BACTERIA: CPT | Performed by: EMERGENCY MEDICINE

## 2024-11-29 PROCEDURE — 83605 ASSAY OF LACTIC ACID: CPT | Performed by: EMERGENCY MEDICINE

## 2024-11-29 RX ORDER — ACETAMINOPHEN 325 MG/1
650 TABLET ORAL ONCE
Status: COMPLETED | OUTPATIENT
Start: 2024-11-29 | End: 2024-11-29

## 2024-11-29 RX ORDER — SODIUM CHLORIDE 0.9 % (FLUSH) 0.9 %
10 SYRINGE (ML) INJECTION AS NEEDED
Status: CANCELLED | OUTPATIENT
Start: 2024-11-29

## 2024-11-29 RX ORDER — SODIUM CHLORIDE 0.9 % (FLUSH) 0.9 %
10 SYRINGE (ML) INJECTION AS NEEDED
Status: DISCONTINUED | OUTPATIENT
Start: 2024-11-29 | End: 2024-11-29 | Stop reason: HOSPADM

## 2024-11-29 RX ORDER — AZITHROMYCIN 250 MG/1
TABLET, FILM COATED ORAL
Qty: 6 TABLET | Refills: 0 | Status: SHIPPED | OUTPATIENT
Start: 2024-11-29

## 2024-11-29 RX ADMIN — Medication 1000 MG: at 12:29

## 2024-11-29 RX ADMIN — SODIUM CHLORIDE 1000 ML: 9 INJECTION, SOLUTION INTRAVENOUS at 12:28

## 2024-11-29 RX ADMIN — ACETAMINOPHEN 650 MG: 325 TABLET ORAL at 12:29

## 2024-11-29 NOTE — ED PROVIDER NOTES
Time: 12:18 PM EST  Date of encounter:  11/29/2024  Independent Historian/Clinical History and Information was obtained by:   Patient and Family    History is limited by: Language Barrier    Chief Complaint: Shortness of breath, fever      History of Present Illness:  Patient is a 64 y.o. year old female who presents to the emergency department for evaluation of shortness of breath and fever.  Reports that she has not felt well for the last 24 to 48 hours.  Family does report that several people in the house have been sick.  She reports that she has had a fever and did take some Tylenol last night.  Does take medicine for arthritis.  No other complaints this time.      Patient Care Team  Primary Care Provider: Cristhian Matos MD    Past Medical History:     No Known Allergies  Past Medical History:   Diagnosis Date    Arthritis     Hypertension      Past Surgical History:   Procedure Laterality Date    WRIST ARTHROSCOPY Left      History reviewed. No pertinent family history.    Home Medications:  Prior to Admission medications    Medication Sig Start Date End Date Taking? Authorizing Provider   folic acid (FOLVITE) 1 MG tablet Take 1 tablet by mouth Daily. 9/27/24   Carlos Crum MD   isoniazid (NYDRAZID) 100 MG tablet Take 2 tablets by mouth Daily. 9/27/24   Carlos Crum MD   methotrexate 2.5 MG tablet TAKE 5 TABLETS BY MOUTH ONCE A WEEK 10/26/24   Carlos Crum MD   predniSONE (DELTASONE) 5 MG tablet Take 1 tablet by mouth Daily. 10/23/24   Carlos Crum MD        Social History:   Social History     Tobacco Use    Smoking status: Never    Smokeless tobacco: Never   Vaping Use    Vaping status: Never Used   Substance Use Topics    Alcohol use: Never    Drug use: Never         Review of Systems:  Review of Systems   Constitutional:  Positive for fever.   Respiratory:  Positive for cough and shortness of breath.         Physical Exam:  /80   Pulse 108   Temp (!) 101.4 °F (38.6  "°C)   Resp 28   Ht 137.2 cm (54\")   Wt 41.1 kg (90 lb 9.7 oz)   SpO2 94%   BMI 21.85 kg/m²     Physical Exam  Vitals and nursing note reviewed.   Constitutional:       Appearance: Normal appearance.   HENT:      Head: Normocephalic and atraumatic.   Eyes:      General: No scleral icterus.  Cardiovascular:      Rate and Rhythm: Normal rate and regular rhythm. Tachycardia present.      Heart sounds: Normal heart sounds.   Pulmonary:      Breath sounds: Rales present.      Comments: Tachypnea with right-sided Rales noted.  Abdominal:      Palpations: Abdomen is soft.      Tenderness: There is no abdominal tenderness.   Musculoskeletal:         General: Normal range of motion.      Cervical back: Normal range of motion.   Skin:     Findings: No rash.   Neurological:      General: No focal deficit present.      Mental Status: She is alert.                  Procedures:  Procedures      Medical Decision Making:      Comorbidities that affect care:    Arthritis    External Notes reviewed:    Reviewed note from 11/14/2024      The following orders were placed and all results were independently analyzed by me:  Orders Placed This Encounter   Procedures    COVID-19, FLU A/B, RSV PCR 1 HR TAT - Swab, Nasopharynx    Blood Culture - Blood,    Blood Culture - Blood,    XR Chest 1 View    [facility] Draw    Comprehensive Metabolic Panel    BNP    Single High Sensitivity Troponin T    CBC Auto Differential    Lactic Acid, Plasma    Scan Slide    Manual Differential    NPO Diet NPO Type: Strict NPO    Undress & Gown    Continuous Pulse Oximetry    Vital Signs    Oxygen Therapy- Nasal Cannula; Titrate 1-6 LPM Per SpO2; 90 - 95%    ECG 12 Lead ED Triage Standing Order; SOA    Insert Peripheral IV    CBC & Differential    Green Top (Gel)    Lavender Top    Gold Top - SST    Light Blue Top       Medications Given in the Emergency Department:  Medications   sodium chloride 0.9 % flush 10 mL (has no administration in time range)   sodium " chloride 0.9 % bolus 1,000 mL (1,000 mL Intravenous New Bag 11/29/24 1228)   acetaminophen (TYLENOL) tablet 650 mg (650 mg Oral Given 11/29/24 1229)   cefTRIAXone (ROCEPHIN) in NS 1 gram/10ml IV PUSH syringe (1,000 mg Intravenous Given 11/29/24 1229)        ED Course:    ED Course as of 11/29/24 1435   Fri Nov 29, 2024   1216 EKG interpreted by me  Time: 1207  Heart rate 137  Sinus tach, nonspecific ST changes, no acute ischemia [MA]   1419 Recheck patient.  Patient now with heart rate in the 100s.  Her breathing has improved significantly.  Discussed findings and discussion about going home versus admission.  Patient prefers to go home at this time.  Discussed very strong return precautions [MA]      ED Course User Index  [MA] Garrison Ma MD       Labs:    Lab Results (last 24 hours)       Procedure Component Value Units Date/Time    CBC & Differential [[id number]]  (Abnormal) Collected: 11/29/24 1218    Specimen: Blood Updated: 11/29/24 1251    Narrative:      The following orders were created for panel order CBC & Differential.  Procedure                               Abnormality         Status                     ---------                               -----------         ------                     CBC Auto Differential[[id number]]        Abnormal            Final result               Scan Slide[[id number]]                                       Final result                 Please view results for these tests on the individual orders.    Comprehensive Metabolic Panel [[id number]]  (Abnormal) Collected: 11/29/24 1218    Specimen: Blood Updated: 11/29/24 1249     Glucose 119 mg/dL      BUN 20 mg/dL      Creatinine 0.72 mg/dL      Sodium 132 mmol/L      Potassium 3.9 mmol/L      Chloride 96 mmol/L      CO2 22.4 mmol/L      Calcium 9.1 mg/dL      Total Protein 7.4 g/dL      Albumin 3.6 g/dL      ALT (SGPT) 14 U/L      AST (SGOT) 19 U/L      Alkaline Phosphatase 81 U/L      Total Bilirubin 0.7 mg/dL      Globulin  3.8 gm/dL      A/G Ratio 0.9 g/dL      BUN/Creatinine Ratio 27.8     Anion Gap 13.6 mmol/L      eGFR 92.9 mL/min/1.73     Narrative:      GFR Normal >60  Chronic Kidney Disease <60  Kidney Failure <15      BNP [[id number]]  (Normal) Collected: 11/29/24 1218    Specimen: Blood Updated: 11/29/24 1245     proBNP 647.6 pg/mL     Narrative:      This assay is used as an aid in the diagnosis of individuals suspected of having heart failure. It can be used as an aid in the diagnosis of acute decompensated heart failure (ADHF) in patients presenting with signs and symptoms of ADHF to the emergency department (ED). In addition, NT-proBNP of <300 pg/mL indicates ADHF is not likely.    Age Range Result Interpretation  NT-proBNP Concentration (pg/mL:      <50             Positive            >450                   Gray                 300-450                    Negative             <300    50-75           Positive            >900                  Gray                300-900                  Negative            <300      >75             Positive            >1800                  Gray                300-1800                  Negative            <300    Single High Sensitivity Troponin T [[id number]]  (Abnormal) Collected: 11/29/24 1218    Specimen: Blood Updated: 11/29/24 1249     HS Troponin T 16 ng/L     Narrative:      High Sensitive Troponin T Reference Range:  <14.0 ng/L- Negative Female for AMI  <22.0 ng/L- Negative Male for AMI  >=14 - Abnormal Female indicating possible myocardial injury.  >=22 - Abnormal Male indicating possible myocardial injury.   Clinicians would have to utilize clinical acumen, EKG, Troponin, and serial changes to determine if it is an Acute Myocardial Infarction or myocardial injury due to an underlying chronic condition.         COVID-19, FLU A/B, RSV PCR 1 HR TAT - Swab, Nasopharynx [[id number]]  (Abnormal) Collected: 11/29/24 1218    Specimen: Swab from Nasopharynx Updated: 11/29/24 1303     COVID19  Not Detected     Influenza A PCR Not Detected     Influenza B PCR Not Detected     RSV, PCR Detected    Narrative:      Fact sheet for providers: https://www.fda.gov/media/926019/download    Fact sheet for patients: https://www.fda.gov/media/503005/download    Test performed by PCR.    CBC Auto Differential [[id number]]  (Abnormal) Collected: 11/29/24 1218    Specimen: Blood Updated: 11/29/24 1251     WBC 13.03 10*3/mm3      RBC 4.32 10*6/mm3      Hemoglobin 12.1 g/dL      Hematocrit 37.5 %      MCV 86.8 fL      MCH 28.0 pg      MCHC 32.3 g/dL      RDW 14.1 %      RDW-SD 44.8 fl      MPV 11.0 fL      Platelets 167 10*3/mm3     Blood Culture - Blood, Arm, Right [[id number]] Collected: 11/29/24 1218    Specimen: Blood from Arm, Right Updated: 11/29/24 1224    Blood Culture - Blood, Arm, Left [[id number]] Collected: 11/29/24 1218    Specimen: Blood from Arm, Left Updated: 11/29/24 1223    Lactic Acid, Plasma [[id number]]  (Normal) Collected: 11/29/24 1218    Specimen: Blood Updated: 11/29/24 1248     Lactate 1.0 mmol/L     Scan Slide [[id number]] Collected: 11/29/24 1218    Specimen: Blood Updated: 11/29/24 1251     Scan Slide --     Comment: See Manual Differential Results       Manual Differential [[id number]]  (Abnormal) Collected: 11/29/24 1218    Specimen: Blood Updated: 11/29/24 1251     Neutrophil % 84.0 %      Lymphocyte % 2.0 %      Monocyte % 7.0 %      Bands %  7.0 %      Neutrophils Absolute 11.86 10*3/mm3      Lymphocytes Absolute 0.26 10*3/mm3      Monocytes Absolute 0.91 10*3/mm3      RBC Morphology Normal     WBC Morphology Normal     Platelet Morphology Normal             Imaging:    XR Chest 1 View    Result Date: 11/29/2024  XR CHEST 1 VW Date of Exam: 11/29/2024 12:20 PM EST Indication: SOA Triage Protocol Comparison: None available. Findings: The heart is normal in size. The lungs are well-expanded and free of infiltrates. Bony structures appear intact.     Impression: No active disease is seen.  Electronically Signed: Emil Collazo MD  11/29/2024 12:40 PM EST  Workstation ID: [id number]       Differential Diagnosis and Discussion:    Dyspnea: Differential diagnosis includes but is not limited to metabolic acidosis, neurological disorders, psychogenic, asthma, pneumothorax, upper airway obstruction, COPD, pneumonia, noncardiogenic pulmonary edema, interstitial lung disease, anemia, congestive heart failure, and pulmonary embolism    All labs were reviewed and interpreted by me.  All X-rays impressions were independently interpreted by me.  EKG was interpreted by me.    MDM       Patient is a 64-year-old female who presents with complaints of shortness of breath as well as fever.  Clinically initially appear to have a pneumonia with right-sided crackles.  Was tachycardic as well as febrile.  Workup here shows slightly elevated white count as well as x-ray that does not show any pneumonia on it.  On exam she does have crackles in her right base.  She was also found to have RSV positive.  At this time was given antibiotics as well as fluids here.  Heart rate initially was in the 130s but now is in the low 100s.  She reports significant improvement at this time.  Will cover with outpatient antibiotics with a Z-Boogie.  Did talk to the patient about possible admission versus discharge home.  Patient first go home at this time since she has had improvement.  I did speak with them about very strong return precautions.  Her troponin was slightly elevated at 16 but she was significantly tachycardic as well as has no infectious source.  I do not think the patient is having any cardiac issues she is also not complaining of any chest pain.  At this time patient okay for outpatient follow-up.  If she has new or worsening symptoms return to the emergency room.          Patient Care Considerations:          Consultants/Shared Management Plan:    None    Social Determinants of Health:    Patient has presented with family  members who are responsible, reliable and will ensure follow up care.      Disposition and Care Coordination:    Discharged: I considered escalation of care by admitting this patient to the hospital, however patient reports significant improvement and patient prefers to go home    I have explained the patient´s condition, diagnoses and treatment plan based on the information available to me at this time. I have answered questions and addressed any concerns. The patient has a good  understanding of the patient´s diagnosis, condition, and treatment plan as can be expected at this point. The vital signs have been stable. The patient´s condition is stable and appropriate for discharge from the emergency department.      The patient will pursue further outpatient evaluation with the primary care physician or other designated or consulting physician as outlined in the discharge instructions. They are agreeable to this plan of care and follow-up instructions have been explained in detail. The patient has received these instructions in written format and have expressed an understanding of the discharge instructions. The patient is aware that any significant change in condition or worsening of symptoms should prompt an immediate return to this or the closest emergency department or call to 911.      Final diagnoses:   RSV infection   Community acquired pneumonia of right lower lobe of lung        ED Disposition       ED Disposition   Discharge    Condition   Stable    Comment   --               This medical record created using voice recognition software.             Garrison Ma MD  11/29/24 1841

## 2024-12-04 LAB
BACTERIA SPEC AEROBE CULT: NORMAL
BACTERIA SPEC AEROBE CULT: NORMAL

## 2024-12-14 LAB
QT INTERVAL: 263 MS
QTC INTERVAL: 396 MS

## 2025-05-14 ENCOUNTER — OFFICE VISIT (OUTPATIENT)
Dept: FAMILY MEDICINE CLINIC | Facility: CLINIC | Age: 65
End: 2025-05-14
Payer: MEDICARE

## 2025-05-14 VITALS
WEIGHT: 92 LBS | TEMPERATURE: 97 F | HEIGHT: 55 IN | SYSTOLIC BLOOD PRESSURE: 100 MMHG | BODY MASS INDEX: 21.29 KG/M2 | OXYGEN SATURATION: 99 % | DIASTOLIC BLOOD PRESSURE: 80 MMHG | HEART RATE: 76 BPM

## 2025-05-14 DIAGNOSIS — R11.11 VOMITING WITHOUT NAUSEA, UNSPECIFIED VOMITING TYPE: ICD-10-CM

## 2025-05-14 DIAGNOSIS — Z78.0 POSTMENOPAUSE: ICD-10-CM

## 2025-05-14 DIAGNOSIS — Z11.59 NEED FOR HEPATITIS C SCREENING TEST: ICD-10-CM

## 2025-05-14 DIAGNOSIS — M05.79 RHEUMATOID ARTHRITIS INVOLVING MULTIPLE SITES WITH POSITIVE RHEUMATOID FACTOR: ICD-10-CM

## 2025-05-14 DIAGNOSIS — R51.9 NONINTRACTABLE HEADACHE, UNSPECIFIED CHRONICITY PATTERN, UNSPECIFIED HEADACHE TYPE: Primary | ICD-10-CM

## 2025-05-14 DIAGNOSIS — R73.09 ELEVATED GLUCOSE: ICD-10-CM

## 2025-05-14 PROBLEM — Z79.1 LONG TERM (CURRENT) USE OF NON-STEROIDAL ANTI-INFLAMMATORIES (NSAID): Status: ACTIVE | Noted: 2025-05-14

## 2025-05-14 PROBLEM — Z51.81 ENCOUNTER FOR THERAPEUTIC DRUG LEVEL MONITORING: Status: ACTIVE | Noted: 2025-05-14

## 2025-05-14 PROBLEM — I10 HYPERTENSION: Status: ACTIVE | Noted: 2025-05-14

## 2025-05-14 PROBLEM — R70.0 ELEVATED ERYTHROCYTE SEDIMENTATION RATE: Status: ACTIVE | Noted: 2025-05-14

## 2025-05-14 LAB
ALBUMIN SERPL-MCNC: 3.8 G/DL (ref 3.5–5.2)
ALBUMIN/GLOB SERPL: 1 G/DL
ALP SERPL-CCNC: 72 U/L (ref 39–117)
ALT SERPL W P-5'-P-CCNC: 12 U/L (ref 1–33)
ANION GAP SERPL CALCULATED.3IONS-SCNC: 9.5 MMOL/L (ref 5–15)
AST SERPL-CCNC: 15 U/L (ref 1–32)
BASOPHILS # BLD AUTO: 0.03 10*3/MM3 (ref 0–0.2)
BASOPHILS NFR BLD AUTO: 0.5 % (ref 0–1.5)
BILIRUB SERPL-MCNC: 0.3 MG/DL (ref 0–1.2)
BUN SERPL-MCNC: 19 MG/DL (ref 8–23)
BUN/CREAT SERPL: 25.3 (ref 7–25)
CALCIUM SPEC-SCNC: 9.7 MG/DL (ref 8.6–10.5)
CHLORIDE SERPL-SCNC: 101 MMOL/L (ref 98–107)
CO2 SERPL-SCNC: 26.5 MMOL/L (ref 22–29)
CREAT SERPL-MCNC: 0.75 MG/DL (ref 0.57–1)
CRP SERPL-MCNC: 1.42 MG/DL (ref 0–0.5)
DEPRECATED RDW RBC AUTO: 44.8 FL (ref 37–54)
EGFRCR SERPLBLD CKD-EPI 2021: 88.5 ML/MIN/1.73
EOSINOPHIL # BLD AUTO: 0.08 10*3/MM3 (ref 0–0.4)
EOSINOPHIL NFR BLD AUTO: 1.2 % (ref 0.3–6.2)
ERYTHROCYTE [DISTWIDTH] IN BLOOD BY AUTOMATED COUNT: 14.6 % (ref 12.3–15.4)
GLOBULIN UR ELPH-MCNC: 3.8 GM/DL
GLUCOSE SERPL-MCNC: 161 MG/DL (ref 65–99)
HCT VFR BLD AUTO: 37 % (ref 34–46.6)
HCV AB SER QL: NORMAL
HGB BLD-MCNC: 12 G/DL (ref 12–15.9)
IMM GRANULOCYTES # BLD AUTO: 0.03 10*3/MM3 (ref 0–0.05)
IMM GRANULOCYTES NFR BLD AUTO: 0.5 % (ref 0–0.5)
LYMPHOCYTES # BLD AUTO: 0.75 10*3/MM3 (ref 0.7–3.1)
LYMPHOCYTES NFR BLD AUTO: 11.6 % (ref 19.6–45.3)
MCH RBC QN AUTO: 27.6 PG (ref 26.6–33)
MCHC RBC AUTO-ENTMCNC: 32.4 G/DL (ref 31.5–35.7)
MCV RBC AUTO: 85.3 FL (ref 79–97)
MONOCYTES # BLD AUTO: 0.42 10*3/MM3 (ref 0.1–0.9)
MONOCYTES NFR BLD AUTO: 6.5 % (ref 5–12)
NEUTROPHILS NFR BLD AUTO: 5.13 10*3/MM3 (ref 1.7–7)
NEUTROPHILS NFR BLD AUTO: 79.7 % (ref 42.7–76)
NRBC BLD AUTO-RTO: 0 /100 WBC (ref 0–0.2)
PLATELET # BLD AUTO: 257 10*3/MM3 (ref 140–450)
PMV BLD AUTO: 10.7 FL (ref 6–12)
POTASSIUM SERPL-SCNC: 3.9 MMOL/L (ref 3.5–5.2)
PROT SERPL-MCNC: 7.6 G/DL (ref 6–8.5)
RBC # BLD AUTO: 4.34 10*6/MM3 (ref 3.77–5.28)
SODIUM SERPL-SCNC: 137 MMOL/L (ref 136–145)
WBC NRBC COR # BLD AUTO: 6.44 10*3/MM3 (ref 3.4–10.8)

## 2025-05-14 PROCEDURE — 83036 HEMOGLOBIN GLYCOSYLATED A1C: CPT | Performed by: STUDENT IN AN ORGANIZED HEALTH CARE EDUCATION/TRAINING PROGRAM

## 2025-05-14 PROCEDURE — 86140 C-REACTIVE PROTEIN: CPT | Performed by: STUDENT IN AN ORGANIZED HEALTH CARE EDUCATION/TRAINING PROGRAM

## 2025-05-14 PROCEDURE — 86200 CCP ANTIBODY: CPT | Performed by: STUDENT IN AN ORGANIZED HEALTH CARE EDUCATION/TRAINING PROGRAM

## 2025-05-14 PROCEDURE — 80053 COMPREHEN METABOLIC PANEL: CPT | Performed by: STUDENT IN AN ORGANIZED HEALTH CARE EDUCATION/TRAINING PROGRAM

## 2025-05-14 PROCEDURE — 86803 HEPATITIS C AB TEST: CPT | Performed by: STUDENT IN AN ORGANIZED HEALTH CARE EDUCATION/TRAINING PROGRAM

## 2025-05-14 PROCEDURE — 85025 COMPLETE CBC W/AUTO DIFF WBC: CPT | Performed by: STUDENT IN AN ORGANIZED HEALTH CARE EDUCATION/TRAINING PROGRAM

## 2025-05-14 RX ORDER — ONDANSETRON 4 MG/1
4 TABLET, ORALLY DISINTEGRATING ORAL EVERY 8 HOURS PRN
Qty: 9 TABLET | Refills: 1 | Status: SHIPPED | OUTPATIENT
Start: 2025-05-14

## 2025-05-14 NOTE — PROGRESS NOTES
"Chief Complaint  Vomiting and Pain (\"Heat in head\")    Subjective          Leeanna Pate presents to [facility]  Vomiting     Pain  Symptoms include vomiting.          History of Present Illness  The patient presents for evaluation of vomiting, headache, and rheumatoid arthritis.    She has been experiencing persistent vomiting since this morning, a symptom she has not previously encountered. She also reports a severe headache, she denies it being the worst ever, she states its about a 5 out of 10 no other household members are sick. She is not experiencing any febrile symptoms or weakness diarrhea or fever. Blood pressure is reported as normal. She has not taken any medication today.    She has a known diagnosis of rheumatoid arthritis. Her rheumatoid factor and CRP levels were previously reported as very high. She is currently on a regimen of methotrexate, prednisone 5 mg, and folic acid.    She has not yet undergone her mammogram or DEXA scan.  She states she will never undergo a mammogram but is agreeable to a DEXA scan today.      Current Outpatient Medications   Medication Instructions    folic acid (FOLVITE) 1 MG tablet 1 tablet, Daily    isoniazid (NYDRAZID) 100 MG tablet 2 tablets, Daily    methotrexate 2.5 MG tablet TAKE 5 TABLETS BY MOUTH ONCE A WEEK    ondansetron ODT (ZOFRAN-ODT) 4 mg, Translingual, Every 8 Hours PRN    predniSONE (DELTASONE) 5 MG tablet 1 tablet, Daily       The following portions of the patient's history were reviewed and updated as appropriate: allergies, current medications, past family history, past medical history, past social history, past surgical history, and problem list.    Objective   Vital Signs:   /80   Pulse 76   Temp 97 °F (36.1 °C)   Ht 137.2 cm (54\")   Wt 41.7 kg (92 lb)   SpO2 99%   BMI 22.18 kg/m²     BP Readings from Last 3 Encounters:   05/14/25 100/80   11/29/24 136/78   11/14/24 130/84     Wt Readings from Last 3 " Encounters:   05/14/25 41.7 kg (92 lb)   11/29/24 41.1 kg (90 lb 9.7 oz)   11/14/24 43.1 kg (95 lb)     BMI is within normal parameters. No other follow-up for BMI required.     Physical Exam  Constitutional:       Appearance: Normal appearance.   HENT:      Head: Normocephalic.      Nose: Nose normal.      Mouth/Throat:      Mouth: Mucous membranes are moist.   Eyes:      Pupils: Pupils are equal, round, and reactive to light.   Cardiovascular:      Rate and Rhythm: Normal rate and regular rhythm.   Pulmonary:      Effort: Pulmonary effort is normal.   Abdominal:      General: Abdomen is flat.   Musculoskeletal:         General: Normal range of motion.      Cervical back: Normal range of motion.   Skin:     General: Skin is warm and dry.   Neurological:      General: No focal deficit present.      Mental Status: She is alert.   Psychiatric:         Mood and Affect: Mood normal.         Thought Content: Thought content normal.          [unfilled]    Result Review :   The following data was reviewed by: Cristhian Matos MD on 05/14/2025:  Common labs          11/29/2024    12:18   Common Labs   Glucose 119    BUN 20    Creatinine 0.72    Sodium 132    Potassium 3.9    Chloride 96    Calcium 9.1    Albumin 3.6    Total Bilirubin 0.7    Alkaline Phosphatase 81    AST (SGOT) 19    ALT (SGPT) 14    WBC 13.03    Hemoglobin 12.1    Hematocrit 37.5    Platelets 167             Lab Results   Component Value Date    SARSANTIGEN Not Detected 01/26/2022    COVID19 Not Detected 11/29/2024    RSV Detected (A) 11/29/2024       Procedures        Assessment and Plan    Diagnoses and all orders for this visit:    1. Nonintractable headache, unspecified chronicity pattern, unspecified headache type (Primary)  -     CT Head Without Contrast; Future    2. Rheumatoid arthritis involving multiple sites with positive rheumatoid factor  -     Cyclic Citrul Peptide Antibody, IgG / IgA; Future  -     C-reactive protein; Future  -     Cyclic Citrul  Peptide Antibody, IgG / IgA  -     C-reactive protein    3. Vomiting without nausea, unspecified vomiting type  -     CBC w AUTO Differential; Future  -     Comprehensive metabolic panel; Future  -     CBC w AUTO Differential  -     Comprehensive metabolic panel    4. Need for hepatitis C screening test  -     Hepatitis C antibody; Future  -     Hepatitis C antibody    5. Postmenopause  -     DEXA Bone Density Axial; Future    Other orders  -     ondansetron ODT (ZOFRAN-ODT) 4 MG disintegrating tablet; Place 1 tablet on the tongue Every 8 (Eight) Hours As Needed for Nausea or Vomiting.  Dispense: 9 tablet; Refill: 1          Assessment & Plan  1. Vomiting.  - vomiting since this morning, accompanied by a severe headache.  - No fever or weakness reported; blood pressure is normal.  - Prescription for Zofran provided to manage vomiting, with instructions to take one tablet every 8 hours.  - CT scan of the head ordered for tomorrow; blood work to be conducted today to rule out potential infections.    2. Headache.  -Reports headache is 5 out of 10  - No fever or weakness reported; blood pressure is normal.  - CT scan of the head ordered for tomorrow to investigate the cause patient family members unable to take her today I was going to order it today..  - Blood work to be conducted today to rule out potential infections.    3. Rheumatoid arthritis.  - Rheumatoid factor significantly above the normal range; very positive.  - Currently on methotrexate, prednisone 5 mg, and folic acid.  - Blood work to be conducted today to monitor inflammation markers.  - DEXA scan ordered due to long-term use of medications and condition.    4. Health maintenance.  - Mammogram not yet undergone; patient declined breast cancer screening.  - DEXA scan ordered due to long-term use of medications and rheumatoid arthritis.  - Patient informed that the DEXA scan will be scheduled for next month or the month after.  - Counseling provided on  the importance of bone density monitoring due to rheumatoid arthritis.      Medications Discontinued During This Encounter   Medication Reason    azithromycin (Zithromax Z-Boogie) 250 MG tablet           Follow Up   No follow-ups on file.  Patient was given instructions and counseling regarding her condition or for health maintenance advice. Please see specific information pulled into the AVS if appropriate.       Cristhian Matos MD  05/14/25  14:58 EDT      Patient or patient representative verbalized consent for the use of Ambient Listening during the visit with  Cristhian Matos MD for chart documentation. 5/14/2025  14:57 EDT

## 2025-05-15 ENCOUNTER — RESULTS FOLLOW-UP (OUTPATIENT)
Dept: FAMILY MEDICINE CLINIC | Facility: CLINIC | Age: 65
End: 2025-05-15

## 2025-05-15 DIAGNOSIS — R73.09 ELEVATED GLUCOSE: Primary | ICD-10-CM

## 2025-05-15 LAB — HBA1C MFR BLD: 6.2 % (ref 4.8–5.6)

## 2025-05-16 LAB — CCP IGA+IGG SERPL IA-ACNC: >250 UNITS (ref 0–19)

## 2025-06-02 ENCOUNTER — HOSPITAL ENCOUNTER (OUTPATIENT)
Dept: BONE DENSITY | Facility: HOSPITAL | Age: 65
Discharge: HOME | End: 2025-06-02
Admitting: STUDENT IN AN ORGANIZED HEALTH CARE EDUCATION/TRAINING PROGRAM
Payer: MEDICARE

## 2025-06-02 DIAGNOSIS — Z78.0 POSTMENOPAUSE: ICD-10-CM

## 2025-06-02 PROCEDURE — 77080 DXA BONE DENSITY AXIAL: CPT

## 2025-06-23 ENCOUNTER — TELEPHONE (OUTPATIENT)
Dept: FAMILY MEDICINE CLINIC | Facility: CLINIC | Age: 65
End: 2025-06-23

## 2025-06-23 NOTE — TELEPHONE ENCOUNTER
Caller: Jena    Relationship to patient: Provider    Best call back number: [phone]    Patient is needing: Dr. Aquino's office called and is wanting a copy of labs from 5/16/2025 faxed to office. Patient is there currently.  Fax#[phone]